# Patient Record
Sex: MALE | Race: WHITE | Employment: UNEMPLOYED | URBAN - METROPOLITAN AREA
[De-identification: names, ages, dates, MRNs, and addresses within clinical notes are randomized per-mention and may not be internally consistent; named-entity substitution may affect disease eponyms.]

---

## 2019-08-23 ENCOUNTER — HOSPITAL ENCOUNTER (EMERGENCY)
Age: 24
Discharge: HOME | End: 2019-08-23
Attending: EMERGENCY MEDICINE
Payer: COMMERCIAL

## 2019-08-23 VITALS
RESPIRATION RATE: 20 BRPM | HEIGHT: 70 IN | WEIGHT: 230 LBS | TEMPERATURE: 98.5 F | DIASTOLIC BLOOD PRESSURE: 56 MMHG | OXYGEN SATURATION: 96 % | BODY MASS INDEX: 32.93 KG/M2 | SYSTOLIC BLOOD PRESSURE: 128 MMHG | HEART RATE: 77 BPM

## 2019-08-23 DIAGNOSIS — S46.812A TRAPEZIUS MUSCLE STRAIN, LEFT, INITIAL ENCOUNTER: ICD-10-CM
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
## 2019-08-23 PROCEDURE — 99281 EMR DPT VST MAYX REQ PHY/QHP: CPT

## 2019-08-23 RX ORDER — IBUPROFEN 600 MG/1
600 TABLET ORAL
Qty: 20 TAB | Refills: 0 | Status: SHIPPED | OUTPATIENT
Start: 2019-08-23

## 2019-08-23 NOTE — DISCHARGE INSTRUCTIONS
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
## 2020-10-01 ENCOUNTER — HOSPITAL ENCOUNTER (EMERGENCY)
Age: 25
Discharge: HOME | End: 2020-10-01
Attending: EMERGENCY MEDICINE
Payer: COMMERCIAL

## 2020-10-01 ENCOUNTER — APPOINTMENT (OUTPATIENT)
Dept: GENERAL RADIOLOGY | Age: 25
End: 2020-10-01
Attending: EMERGENCY MEDICINE
Payer: COMMERCIAL

## 2020-10-01 VITALS
BODY MASS INDEX: 32.93 KG/M2 | DIASTOLIC BLOOD PRESSURE: 89 MMHG | WEIGHT: 230 LBS | RESPIRATION RATE: 18 BRPM | HEART RATE: 87 BPM | OXYGEN SATURATION: 100 % | SYSTOLIC BLOOD PRESSURE: 142 MMHG | TEMPERATURE: 98.1 F | HEIGHT: 70 IN
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
## 2020-10-01 PROCEDURE — 90675 RABIES VACCINE IM: CPT | Performed by: EMERGENCY MEDICINE

## 2020-10-01 PROCEDURE — 74011250636 HC RX REV CODE- 250/636: Performed by: EMERGENCY MEDICINE

## 2020-10-01 PROCEDURE — 99283 EMERGENCY DEPT VISIT LOW MDM: CPT

## 2020-10-01 PROCEDURE — 96372 THER/PROPH/DIAG INJ SC/IM: CPT

## 2020-10-01 PROCEDURE — 74011250637 HC RX REV CODE- 250/637: Performed by: EMERGENCY MEDICINE

## 2020-10-01 PROCEDURE — 90471 IMMUNIZATION ADMIN: CPT

## 2020-10-01 PROCEDURE — 90472 IMMUNIZATION ADMIN EACH ADD: CPT

## 2020-10-01 PROCEDURE — 90375 RABIES IG IM/SC: CPT | Performed by: EMERGENCY MEDICINE

## 2020-10-01 PROCEDURE — 73080 X-RAY EXAM OF ELBOW: CPT

## 2020-10-01 RX ORDER — BACITRACIN 500 UNIT/G
1 PACKET (EA) TOPICAL
Status: DISCONTINUED | OUTPATIENT
Start: 2020-10-01 | End: 2020-10-01 | Stop reason: HOSPADM

## 2020-10-01 RX ORDER — ACETAMINOPHEN 500 MG
1000 TABLET ORAL ONCE
Status: COMPLETED | OUTPATIENT
Start: 2020-10-01 | End: 2020-10-01

## 2020-10-01 RX ORDER — CEPHALEXIN 250 MG/1
500 CAPSULE ORAL
Status: COMPLETED | OUTPATIENT
Start: 2020-10-01 | End: 2020-10-01

## 2020-10-01 RX ORDER — CEPHALEXIN 500 MG/1
500 CAPSULE ORAL 2 TIMES DAILY
Qty: 14 CAP | Refills: 0 | Status: SHIPPED | OUTPATIENT
Start: 2020-10-01 | End: 2020-10-08

## 2020-10-01 RX ADMIN — RABIES IMMUNE GLOBULIN (HUMAN) 1500 UNITS: 300 INJECTION, SOLUTION INFILTRATION; INTRAMUSCULAR at 20:03

## 2020-10-01 RX ADMIN — RABIES IMMUNE GLOBULIN (HUMAN) 600 UNITS: 300 INJECTION, SOLUTION INFILTRATION; INTRAMUSCULAR at 20:13

## 2020-10-01 RX ADMIN — RABIES VACCINE 2.5 UNITS: KIT at 19:57

## 2020-10-01 RX ADMIN — ACETAMINOPHEN 1000 MG: 500 TABLET ORAL at 19:36

## 2020-10-01 RX ADMIN — CEPHALEXIN 500 MG: 250 CAPSULE ORAL at 19:36
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
## 2020-10-01 NOTE — ED PROVIDER NOTES
The history is provided by the patient and a friend. No  was used. Dog Bite    The incident occurred today. The incident occurred at home. He came to the ER via personal transport. There is an injury to the left forearm. The pain is moderate. It is unlikely that a foreign body is present. Pertinent negatives include no chest pain, no numbness, no visual disturbance, no abdominal pain, no nausea, no headaches, no neck pain, no light-headedness, no seizures and no weakness. There have been no prior injuries to these areas. He is right-handed. His tetanus status is UTD. He has been behaving normally.         Past Medical History:   Diagnosis Date    ADHD (attention deficit hyperactivity disorder)     Concussion     Mood disorder (HCC)        Past Surgical History:   Procedure Laterality Date    HX HEENT      BMWT    HX OTHER SURGICAL      achilles tendon repair    HX TYMPANOSTOMY      tongue clipped - tongue tied         Family History:   Problem Relation Age of Onset    Cancer Maternal Grandmother 71        pancreatic    Cancer Paternal Grandmother 79        lung cancer    Heart Disease Maternal Grandfather         A FIB    Diabetes Paternal Uncle     Heart Disease Other         M GR GRANDFATHER-MI    Asthma Neg Hx     Hypertension Neg Hx     Anesth Problems Neg Hx        Social History     Socioeconomic History    Marital status: SINGLE     Spouse name: Not on file    Number of children: Not on file    Years of education: Not on file    Highest education level: Not on file   Occupational History    Not on file   Social Needs    Financial resource strain: Not on file    Food insecurity     Worry: Not on file     Inability: Not on file    Transportation needs     Medical: Not on file     Non-medical: Not on file   Tobacco Use    Smoking status: Never Smoker    Smokeless tobacco: Never Used   Substance and Sexual Activity    Alcohol use: No    Drug use: No    Sexual activity: Never   Lifestyle    Physical activity     Days per week: Not on file     Minutes per session: Not on file    Stress: Not on file   Relationships    Social connections     Talks on phone: Not on file     Gets together: Not on file     Attends [employer] service: Not on file     Active member of club or organization: Not on file     Attends meetings of clubs or organizations: Not on file     Relationship status: Not on file    Intimate partner violence     Fear of current or ex partner: Not on file     Emotionally abused: Not on file     Physically abused: Not on file     Forced sexual activity: Not on file   Other Topics Concern    Not on file   Social History Narrative    Not on file         ALLERGIES: Patient has no known allergies. Review of Systems   Constitutional: Negative for activity change, chills and fever. HENT: Negative for nosebleeds, sore throat, trouble swallowing and voice change. Eyes: Negative for visual disturbance. Respiratory: Negative for shortness of breath. Cardiovascular: Negative for chest pain and palpitations. Gastrointestinal: Negative for abdominal pain, constipation, diarrhea and nausea. Genitourinary: Negative for difficulty urinating, dysuria, hematuria and urgency. Musculoskeletal: Negative for back pain, neck pain and neck stiffness. Skin: Positive for wound. Negative for color change. Allergic/Immunologic: Negative for immunocompromised state. Neurological: Negative for dizziness, seizures, syncope, weakness, light-headedness, numbness and headaches. Psychiatric/Behavioral: Negative for behavioral problems, confusion, hallucinations, self-injury and suicidal ideas. Vitals:    10/01/20 1800   BP: (!) 142/89   Pulse: 87   Resp: 18   Temp: 98.1 °F (36.7 °C)   SpO2: 97%   Weight: 104.3 kg (230 lb)   Height: 5' 10\" (1.778 m)            Physical Exam  Vitals signs and nursing note reviewed.    Constitutional:       General: He is not in acute distress. Appearance: He is well-developed. He is not diaphoretic. HENT:      Head: Atraumatic. Neck:      Trachea: No tracheal deviation. Cardiovascular:      Comments: Warm and well perfused  Pulmonary:      Effort: Pulmonary effort is normal. No respiratory distress. Musculoskeletal: Normal range of motion. Left forearm: He exhibits tenderness and bony tenderness. Arms:    Skin:     General: Skin is warm and dry. Neurological:      Mental Status: He is alert. Coordination: Coordination normal.   Psychiatric:         Behavior: Behavior normal.         Thought Content: Thought content normal.         Judgment: Judgment normal.          MDM      This is a 72-year-old male with past medical history, review of systems, physical exam as above, presenting with complaints of left forearm pain. Patient states approximately 2 hours prior to arrival, he was bit while breaking up a fight between 2 dogs. Patient states 1 dog is his own, however the other belongs to a neighbor and he is uncertain as to its vaccination status. Patient states on tetanus status date. He denies other complaints or trauma. Physical exam is remarkable for well-appearing young male, in no acute distress, with scattered scrapes, as well as 2 puncture marks to the anterior aspect of the left forearm just distal left elbow, pulse motor and sensation intact. Plan to obtain plain films to evaluate for foreign body, provide pain control, oral antibiotics, and initiate rabies vaccination series. Likely discharge home with primary care follow-up, infusion center for continuation of rabies series. Disposition pending. Procedures    Update:  Plain films without foreign body, range of motion appears preserved. Rabies vaccine and immunoglobulin provided, oral antibiotics.   Will discharge home with the same, case management to contact tomorrow for arranging infusion center treatments, primary care follow-up, return precautions given.

## 2020-10-01 NOTE — DISCHARGE INSTRUCTIONS
Patient Education        Animal Bites: Care Instructions  Your Care Instructions  After an animal bite, the biggest concern is infection. The chance of infection depends on the type of animal that bit you, where on your body you were bitten, and your general health. Many animal bites are not closed with stitches, because this can increase the chance of infection. Your bite may take as little as 7 days or as long as several months to heal, depending on how bad it is. Taking good care of your wound at home will help it heal and reduce your chance of infection. The doctor has checked you carefully, but problems can develop later. If you notice any problems or new symptoms, get medical treatment right away. Follow-up care is a key part of your treatment and safety. Be sure to make and go to all appointments, and call your doctor if you are having problems. It's also a good idea to know your test results and keep a list of the medicines you take. How can you care for yourself at home? · If your doctor told you how to care for your wound, follow your doctor's instructions. If you did not get instructions, follow this general advice:  ? After 24 to 48 hours, gently wash the wound with clean water 2 times a day. Do not scrub or soak the wound. Don't use hydrogen peroxide or alcohol, which can slow healing. ? You may cover the wound with a thin layer of petroleum jelly, such as Vaseline, and a nonstick bandage. ? Apply more petroleum jelly and replace the bandage as needed. · After you shower, gently dry the wound with a clean towel. · If your doctor has closed the wound, cover the bandage with a plastic bag before you take a shower. · A small amount of skin redness and swelling around the wound edges and the stitches or staples is normal. Your wound may itch or feel irritated. Do not scratch or rub the wound.   · Ask your doctor if you can take an over-the-counter pain medicine, such as acetaminophen (Tylenol), ibuprofen (Advil, Motrin), or naproxen (Aleve). Read and follow all instructions on the label. · Do not take two or more pain medicines at the same time unless the doctor told you to. Many pain medicines have acetaminophen, which is Tylenol. Too much acetaminophen (Tylenol) can be harmful. · If your bite puts you at risk for rabies, you will get a series of shots over the next few weeks to prevent rabies. Your doctor will tell you when to get the shots. It is very important that you get the full cycle of shots. Follow your doctor's instructions exactly. · You may need a tetanus shot if you have not received one in the last 5 years. · If your doctor prescribed antibiotics, take them as directed. Do not stop taking them just because you feel better. You need to take the full course of antibiotics. When should you call for help? Call your doctor now or seek immediate medical care if:    · The skin near the bite turns cold or pale or it changes color.     · You lose feeling in the area near the bite, or it feels numb or tingly.     · You have trouble moving a limb near the bite.     · You have symptoms of infection, such as:  ? Increased pain, swelling, warmth, or redness near the wound. ? Red streaks leading from the wound. ? Pus draining from the wound. ? A fever.     · Blood soaks through the bandage. Oozing small amounts of blood is normal.     · Your pain is getting worse. Watch closely for changes in your health, and be sure to contact your doctor if you are not getting better as expected. Where can you learn more? Go to http://www.gray.com/  Enter K183 in the search box to learn more about \"Animal Bites: Care Instructions. \"  Current as of: June 26, 2019               Content Version: 12.6  © 5288-3458 [employer]. Care instructions adapted under license by [employer] (which disclaims liability or warranty for this information).  If you have questions about a medical condition or this instruction, always ask your healthcare professional. [place] [address] any warranty or liability for your use of this information.